# Patient Record
Sex: MALE | Race: WHITE | NOT HISPANIC OR LATINO | URBAN - METROPOLITAN AREA
[De-identification: names, ages, dates, MRNs, and addresses within clinical notes are randomized per-mention and may not be internally consistent; named-entity substitution may affect disease eponyms.]

---

## 2018-03-14 ENCOUNTER — EMERGENCY (EMERGENCY)
Facility: HOSPITAL | Age: 58
LOS: 0 days | Discharge: HOME | End: 2018-03-14
Attending: EMERGENCY MEDICINE | Admitting: EMERGENCY MEDICINE
Payer: COMMERCIAL

## 2018-03-14 VITALS
HEART RATE: 950 BPM | RESPIRATION RATE: 18 BRPM | OXYGEN SATURATION: 100 % | SYSTOLIC BLOOD PRESSURE: 117 MMHG | HEIGHT: 75 IN | WEIGHT: 207.9 LBS | TEMPERATURE: 102 F | DIASTOLIC BLOOD PRESSURE: 70 MMHG

## 2018-03-14 VITALS
TEMPERATURE: 99 F | SYSTOLIC BLOOD PRESSURE: 122 MMHG | RESPIRATION RATE: 19 BRPM | HEART RATE: 73 BPM | OXYGEN SATURATION: 100 % | DIASTOLIC BLOOD PRESSURE: 62 MMHG
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
## 2018-03-14 DIAGNOSIS — S09.90XA UNSPECIFIED INJURY OF HEAD, INITIAL ENCOUNTER: ICD-10-CM
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
## 2018-03-14 DIAGNOSIS — J18.9 PNEUMONIA, UNSPECIFIED ORGANISM: ICD-10-CM

## 2018-03-14 DIAGNOSIS — R55 SYNCOPE AND COLLAPSE: ICD-10-CM

## 2018-03-14 LAB
ALBUMIN SERPL ELPH-MCNC: 3.7 G/DL — SIGNIFICANT CHANGE UP (ref 3.3–5)
ALP SERPL-CCNC: 54 U/L — SIGNIFICANT CHANGE UP (ref 40–120)
ALT FLD-CCNC: 30 U/L — SIGNIFICANT CHANGE UP (ref 12–78)
ANION GAP SERPL CALC-SCNC: 6 MMOL/L — SIGNIFICANT CHANGE UP (ref 5–17)
AST SERPL-CCNC: 26 U/L — SIGNIFICANT CHANGE UP (ref 15–37)
BASOPHILS # BLD AUTO: 0.04 K/UL — SIGNIFICANT CHANGE UP (ref 0–0.2)
BASOPHILS NFR BLD AUTO: 0.6 % — SIGNIFICANT CHANGE UP (ref 0–2)
BILIRUB SERPL-MCNC: 0.5 MG/DL — SIGNIFICANT CHANGE UP (ref 0.2–1.2)
BUN SERPL-MCNC: 24 MG/DL — HIGH (ref 7–23)
CALCIUM SERPL-MCNC: 8.3 MG/DL — LOW (ref 8.5–10.1)
CHLORIDE SERPL-SCNC: 104 MMOL/L — SIGNIFICANT CHANGE UP (ref 96–108)
CO2 SERPL-SCNC: 27 MMOL/L — SIGNIFICANT CHANGE UP (ref 22–31)
CREAT SERPL-MCNC: 1.48 MG/DL — HIGH (ref 0.5–1.3)
EOSINOPHIL # BLD AUTO: 0.01 K/UL — SIGNIFICANT CHANGE UP (ref 0–0.5)
EOSINOPHIL NFR BLD AUTO: 0.1 % — SIGNIFICANT CHANGE UP (ref 0–6)
GLUCOSE SERPL-MCNC: 142 MG/DL — HIGH (ref 70–99)
HCT VFR BLD CALC: 42.5 % — SIGNIFICANT CHANGE UP (ref 39–50)
HGB BLD-MCNC: 14.2 G/DL — SIGNIFICANT CHANGE UP (ref 13–17)
IMM GRANULOCYTES NFR BLD AUTO: 0.3 % — SIGNIFICANT CHANGE UP (ref 0–1.5)
INR BLD: 1.21 RATIO — HIGH (ref 0.88–1.16)
LYMPHOCYTES # BLD AUTO: 0.71 K/UL — LOW (ref 1–3.3)
LYMPHOCYTES # BLD AUTO: 10.1 % — LOW (ref 13–44)
MAGNESIUM SERPL-MCNC: 1.7 MG/DL — SIGNIFICANT CHANGE UP (ref 1.6–2.6)
MCHC RBC-ENTMCNC: 30.2 PG — SIGNIFICANT CHANGE UP (ref 27–34)
MCHC RBC-ENTMCNC: 33.4 GM/DL — SIGNIFICANT CHANGE UP (ref 32–36)
MCV RBC AUTO: 90.4 FL — SIGNIFICANT CHANGE UP (ref 80–100)
MONOCYTES # BLD AUTO: 0.64 K/UL — SIGNIFICANT CHANGE UP (ref 0–0.9)
MONOCYTES NFR BLD AUTO: 9.1 % — SIGNIFICANT CHANGE UP (ref 2–14)
NEUTROPHILS # BLD AUTO: 5.64 K/UL — SIGNIFICANT CHANGE UP (ref 1.8–7.4)
NEUTROPHILS NFR BLD AUTO: 79.8 % — HIGH (ref 43–77)
NRBC # BLD: 0 /100 WBCS — SIGNIFICANT CHANGE UP (ref 0–0)
PLATELET # BLD AUTO: 181 K/UL — SIGNIFICANT CHANGE UP (ref 150–400)
POTASSIUM SERPL-MCNC: 4.3 MMOL/L — SIGNIFICANT CHANGE UP (ref 3.5–5.3)
POTASSIUM SERPL-SCNC: 4.3 MMOL/L — SIGNIFICANT CHANGE UP (ref 3.5–5.3)
PROT SERPL-MCNC: 7.4 GM/DL — SIGNIFICANT CHANGE UP (ref 6–8.3)
PROTHROM AB SERPL-ACNC: 13.1 SEC — HIGH (ref 9.8–12.7)
RBC # BLD: 4.7 M/UL — SIGNIFICANT CHANGE UP (ref 4.2–5.8)
RBC # FLD: 13 % — SIGNIFICANT CHANGE UP (ref 10.3–14.5)
SODIUM SERPL-SCNC: 137 MMOL/L — SIGNIFICANT CHANGE UP (ref 135–145)
TROPONIN I SERPL-MCNC: <0.015 NG/ML — SIGNIFICANT CHANGE UP (ref 0.01–0.04)
WBC # BLD: 7.06 K/UL — SIGNIFICANT CHANGE UP (ref 3.8–10.5)
WBC # FLD AUTO: 7.06 K/UL — SIGNIFICANT CHANGE UP (ref 3.8–10.5)

## 2018-03-14 PROCEDURE — 71045 X-RAY EXAM CHEST 1 VIEW: CPT | Mod: 26

## 2018-03-14 PROCEDURE — 93010 ELECTROCARDIOGRAM REPORT: CPT

## 2018-03-14 PROCEDURE — 70450 CT HEAD/BRAIN W/O DYE: CPT | Mod: 26

## 2018-03-14 PROCEDURE — 99284 EMERGENCY DEPT VISIT MOD MDM: CPT

## 2018-03-14 RX ORDER — ACETAMINOPHEN 500 MG
650 TABLET ORAL ONCE
Qty: 0 | Refills: 0 | Status: COMPLETED | OUTPATIENT
Start: 2018-03-14 | End: 2018-03-14

## 2018-03-14 RX ORDER — SODIUM CHLORIDE 9 MG/ML
2000 INJECTION INTRAMUSCULAR; INTRAVENOUS; SUBCUTANEOUS ONCE
Qty: 0 | Refills: 0 | Status: COMPLETED | OUTPATIENT
Start: 2018-03-14 | End: 2018-03-14

## 2018-03-14 RX ADMIN — SODIUM CHLORIDE 1000 MILLILITER(S): 9 INJECTION INTRAMUSCULAR; INTRAVENOUS; SUBCUTANEOUS at 08:14

## 2018-03-14 RX ADMIN — Medication 650 MILLIGRAM(S): at 08:04

## 2018-03-14 NOTE — ED ADULT TRIAGE NOTE - CHIEF COMPLAINT QUOTE
patient presents in ED by [facility] with a syncope episode at the gym. patient was in the shower after his workout and passed out. denies any medical problems. on no current medications. patient did his head. denies any pain. patient febrile in triage.

## 2018-03-14 NOTE — ED ADULT NURSE NOTE - OBJECTIVE STATEMENT
pt presents to ED via EMS from gym, pt alert and orientedx4, febrile, pt c/o syncope at the gyn, states he was feeling dizzy and has been sick for the apst two weeks. pt c/o kush aches cough and chilsl,  pt states he had gone ont he eliptical did not feel well, and went to shwoer when he syncopized. pt denies dizziness at this time no chest pain SOB,  dizziness weakness, safety maintained neuros intact, will continue to monitor

## 2018-03-14 NOTE — ED ADULT NURSE NOTE - CHIEF COMPLAINT QUOTE
patient presents in ED by [facility] with a syncope episode at the gym. patient was in the shower after his workout and passed out. denies any medical problems. on no current medications. patient did his head. denies any pain. patient febrile in triage.

## 2018-03-14 NOTE — ED PROVIDER NOTE - MEDICAL DECISION MAKING DETAILS
Pt febrile, dehydrated, and has community acquired pneumonia.  Treated with tylenol, IV fluids, and levaquin.  Feeling better.  Okay for d/c home.  Continue antibiotics, hydration, f/u with PCP.

## 2018-03-14 NOTE — ED PROVIDER NOTE - OBJECTIVE STATEMENT
56 yo M presents with no significant PMHx presents with CC of syncope.  Pt worked out this morning, went to sauna, and then took a shower.  Pt had 15 second syncopal event in the shower.  Pt did hit the back of his head on the floor.  States recently has had cough, and cold symptoms.  Denies any other symptoms at this time, and states he's starting to feel better. No other concerns.

## 2018-03-15 RX ORDER — CIPROFLOXACIN LACTATE 400MG/40ML
1 VIAL (ML) INTRAVENOUS
Qty: 4 | Refills: 0 | OUTPATIENT
Start: 2018-03-15 | End: 2018-03-18

## 2019-04-03 RX ORDER — FENTANYL CITRATE 50 UG/ML
25 INJECTION INTRAVENOUS
Qty: 0 | Refills: 0 | Status: DISCONTINUED | OUTPATIENT
Start: 2019-04-04 | End: 2019-04-04

## 2019-04-03 RX ORDER — FENTANYL CITRATE 50 UG/ML
50 INJECTION INTRAVENOUS
Qty: 0 | Refills: 0 | Status: DISCONTINUED | OUTPATIENT
Start: 2019-04-04 | End: 2019-04-04

## 2019-04-03 RX ORDER — OXYCODONE HYDROCHLORIDE 5 MG/1
5 TABLET ORAL ONCE
Qty: 0 | Refills: 0 | Status: DISCONTINUED | OUTPATIENT
Start: 2019-04-04 | End: 2019-04-04

## 2019-04-03 RX ORDER — ONDANSETRON 8 MG/1
4 TABLET, FILM COATED ORAL ONCE
Qty: 0 | Refills: 0 | Status: DISCONTINUED | OUTPATIENT
Start: 2019-04-04 | End: 2019-04-19

## 2019-04-04 ENCOUNTER — OUTPATIENT (OUTPATIENT)
Dept: OUTPATIENT SERVICES | Facility: HOSPITAL | Age: 59
LOS: 1 days | Discharge: HOME | End: 2019-04-04
Payer: COMMERCIAL

## 2019-04-04 VITALS
SYSTOLIC BLOOD PRESSURE: 129 MMHG | RESPIRATION RATE: 16 BRPM | DIASTOLIC BLOOD PRESSURE: 61 MMHG | TEMPERATURE: 98 F | HEART RATE: 62 BPM | OXYGEN SATURATION: 100 %

## 2019-04-04 VITALS
DIASTOLIC BLOOD PRESSURE: 78 MMHG | HEART RATE: 60 BPM | RESPIRATION RATE: 16 BRPM | TEMPERATURE: 98 F | OXYGEN SATURATION: 100 % | WEIGHT: 197.98 LBS | HEIGHT: 75 IN | SYSTOLIC BLOOD PRESSURE: 124 MMHG

## 2019-04-04 LAB
ABO RH CONFIRMATION: SIGNIFICANT CHANGE UP
APTT BLD: 29.8 SEC — SIGNIFICANT CHANGE UP (ref 27.5–36.3)
BLD GP AB SCN SERPL QL: SIGNIFICANT CHANGE UP
INR BLD: 1.03 RATIO — SIGNIFICANT CHANGE UP (ref 0.88–1.16)
PROTHROM AB SERPL-ACNC: 11.5 SEC — SIGNIFICANT CHANGE UP (ref 10–12.9)
TYPE + AB SCN PNL BLD: SIGNIFICANT CHANGE UP

## 2019-04-04 PROCEDURE — 88304 TISSUE EXAM BY PATHOLOGIST: CPT | Mod: 26

## 2019-04-04 RX ORDER — FAMOTIDINE 10 MG/ML
20 INJECTION INTRAVENOUS ONCE
Qty: 0 | Refills: 0 | Status: COMPLETED | OUTPATIENT
Start: 2019-04-04 | End: 2019-04-04

## 2019-04-04 RX ORDER — ACETAMINOPHEN 500 MG
975 TABLET ORAL ONCE
Qty: 0 | Refills: 0 | Status: COMPLETED | OUTPATIENT
Start: 2019-04-04 | End: 2019-04-04

## 2019-04-04 RX ADMIN — Medication 975 MILLIGRAM(S): at 12:41

## 2019-04-04 RX ADMIN — FENTANYL CITRATE 50 MICROGRAM(S): 50 INJECTION INTRAVENOUS at 17:53

## 2019-04-04 RX ADMIN — FAMOTIDINE 20 MILLIGRAM(S): 10 INJECTION INTRAVENOUS at 12:40

## 2019-04-04 NOTE — ASU DISCHARGE PLAN (ADULT/PEDIATRIC) - NURSING INSTRUCTIONS
Review home care information sheet Begin with liquids and light food ( tea, toast, Jello, soups). Advance to what you normally eat. Liquids should taken in adequate amounts today.Refer to multi color post op discharge instruction sheet     CALL the DOCTOR:    -Fever greater than  101F  - Signs  of infection such as : increase pain,swelling,redness,or a bad  smell coming from the wound.  -Excessive amount of bleeding.  - Any pain that appears to be getting worse.  - Vomiting  -  If you have  not urinated 8 hours after surgery or have any difficulty urinating.     A responsible adult should be with you for the rest of the day and night for your safety and to help you if you needed.    Review attached FACT SHEET if applicable. For any problems or concerns,contact your doctor. [facility] patients should call the [facility]. If you cannot reach the doctor or clinic, call [facility] Emergency Department at [phone] or go to your local Emergency Department.  A responsible adult should be with you for the rest of the day and night for your safety and to help you if you needed. Resume your medications as listed on the attached Medication Record.

## 2019-04-04 NOTE — ASU DISCHARGE PLAN (ADULT/PEDIATRIC) - CALL YOUR DOCTOR IF YOU HAVE ANY OF THE FOLLOWING:
Numbness, tingling, color or temperature change to extremity/Pain not relieved by Medications/Nausea and vomiting that does not stop/Inability to tolerate liquids or foods/Bleeding that does not stop

## 2019-04-04 NOTE — ASU DISCHARGE PLAN (ADULT/PEDIATRIC) - ACTIVITY LEVEL
No heavy lifting/today: keep R leg elevated, walk for ~ 5-10 minutes on the hour today while awake; tomorrow: resume normal activity as tolerated keeping R leg elevated when not walking/Elevate extremity

## 2019-04-08 LAB — SURGICAL PATHOLOGY STUDY: SIGNIFICANT CHANGE UP

## 2019-04-10 DIAGNOSIS — I87.8 OTHER SPECIFIED DISORDERS OF VEINS: ICD-10-CM

## 2019-04-10 DIAGNOSIS — I83.91 ASYMPTOMATIC VARICOSE VEINS OF RIGHT LOWER EXTREMITY: ICD-10-CM

## 2019-04-10 DIAGNOSIS — E78.00 PURE HYPERCHOLESTEROLEMIA, UNSPECIFIED: ICD-10-CM

## 2019-04-10 DIAGNOSIS — Z87.01 PERSONAL HISTORY OF PNEUMONIA (RECURRENT): ICD-10-CM

## 2021-09-09 NOTE — ED PROVIDER NOTE - ENDOCRINE NEGATIVE STATEMENT, MLM
Show Applicator Variable?: Yes Duration Of Freeze Thaw-Cycle (Seconds): 1 Post-Care Instructions: I reviewed with the patient in detail post-care instructions. Patient is to wear sunprotection, and avoid picking at any of the treated lesions. Pt may apply Vaseline to crusted or scabbing areas. Consent: The patient's consent was obtained including but not limited to risks of crusting, scabbing, blistering, scarring, darker or lighter pigmentary change, recurrence, incomplete removal and infection. Detail Level: Detailed Number Of Freeze-Thaw Cycles: 3 freeze-thaw cycles no diabetes and no thyroid trouble.

## 2021-12-28 ENCOUNTER — OUTPATIENT (OUTPATIENT)
Dept: OUTPATIENT SERVICES | Facility: HOSPITAL | Age: 61
LOS: 1 days | End: 2021-12-28
Payer: COMMERCIAL

## 2021-12-28 DIAGNOSIS — Z20.828 CONTACT WITH AND (SUSPECTED) EXPOSURE TO OTHER VIRAL COMMUNICABLE DISEASES: ICD-10-CM

## 2021-12-28 PROBLEM — J18.9 PNEUMONIA, UNSPECIFIED ORGANISM: Chronic | Status: ACTIVE | Noted: 2019-04-03

## 2021-12-28 PROBLEM — I83.90 ASYMPTOMATIC VARICOSE VEINS OF UNSPECIFIED LOWER EXTREMITY: Chronic | Status: ACTIVE | Noted: 2019-04-03

## 2021-12-28 LAB — SARS-COV-2 RNA SPEC QL NAA+PROBE: SIGNIFICANT CHANGE UP

## 2021-12-28 PROCEDURE — U0005: CPT

## 2021-12-28 PROCEDURE — U0003: CPT

## 2021-12-28 PROCEDURE — C9803: CPT

## 2021-12-29 DIAGNOSIS — Z20.828 CONTACT WITH AND (SUSPECTED) EXPOSURE TO OTHER VIRAL COMMUNICABLE DISEASES: ICD-10-CM

## 2024-12-18 DIAGNOSIS — Z00.00 ENCOUNTER FOR GENERAL ADULT MEDICAL EXAMINATION W/OUT ABNORMAL FINDINGS: ICD-10-CM

## 2024-12-20 ENCOUNTER — APPOINTMENT (OUTPATIENT)
Dept: ORTHOPEDIC SURGERY | Facility: CLINIC | Age: 64
End: 2024-12-20
Payer: COMMERCIAL

## 2024-12-20 VITALS — HEIGHT: 74 IN | WEIGHT: 193 LBS | BODY MASS INDEX: 24.77 KG/M2

## 2024-12-20 DIAGNOSIS — M76.892 OTHER SPECIFIED ENTHESOPATHIES OF LEFT LOWER LIMB, EXCLUDING FOOT: ICD-10-CM

## 2024-12-20 PROCEDURE — 73502 X-RAY EXAM HIP UNI 2-3 VIEWS: CPT

## 2024-12-20 PROCEDURE — 99204 OFFICE O/P NEW MOD 45 MIN: CPT

## 2024-12-20 NOTE — DISCUSSION/SUMMARY
[de-identified] : The patient has left hip abductor tendinitis and greater trochanteric bursitis.   They are not an appropriate candidate for surgical intervention at this time. An extensive discussion was conducted on the natural history of the disease and the variety of surgical and non-surgical options available to the patient including, but not limited to non-steroidal anti-inflammatory medications, steroid injections, physical therapy, maintenance of ideal body weight, and reduction of activity. I recommended a prescription of Mobic but the patient would prefer to use OTC NSAIDs at this time. The patient will schedule an appointment as needed.Today I provided him with a home exercise program, physical therapy prescription, picture of a foam roller and he will use over-the-counter anti-inflammatories. PT also rx.  If there is no improvement over the next 6 to 8 weeks he should give us a call and we will look to get an MRI of the left hip.

## 2024-12-20 NOTE — HISTORY OF PRESENT ILLNESS
[de-identified] : This is a very nice  64 year old  male experiencing pain in the lateral aspect of the left  hip which is moderate - severe in intensity and has been going on for at least 4 months . The pain somewhat limits activities of daily living. Walking tolerance is reduced. Medication and activity modification have been minimally effective. The patient does not report any pain in the groin.  The patient denies any radiation of the pain to the feet and it is not associated with numbness, tingling, or weakness.

## 2024-12-20 NOTE — PHYSICAL EXAM
[de-identified] : Well developed, well nourished in no apparent distress, awake, alert and orientated to person, place and time with appropriate mood and affect Respirations are even and unlabored. Gait evaluation does not reveal a limp. There is no inguinal adenopathy. The affected limb is well-perfused with palpable pedal pulse, without skin lesions, shows a grossly normal motor and sensory examination. Satisfactory ROM of the affected hip without groin pain. Palpation to the lateral aspect of the hip reproduces pain. [de-identified] :  AP pelvis, AP and lateral hip radiographs of the left hip were ordered and taken in the office and demonstrate no evidence of degenerative joint disease of the hip with maintained joint space and no evidence of fractures or other intraarticular pathology.

## 2025-06-07 NOTE — ED ADULT TRIAGE NOTE - BMI (KG/M2)
"KIDNEY TRANSPLANT MEDICINE PROGRESS NOTE    Please refer to detailed progress note from 6/6 for complete details of this admission.    Interval history: patient is feeling fine. No acute issues to discuss with me    Past medical, surgical, family, social history reviewed & unchanged since admission.     I/O last 3 completed shifts:  In: -   Out: 3300 [Urine:3300]    VITALS:  height is 6' 1" (1.854 m) and weight is 93.6 kg (206 lb 5.6 oz). His oral temperature is 97.6 °F (36.4 °C). His blood pressure is 148/73 (abnormal) and his pulse is 62. His respiration is 17 and oxygen saturation is 99%.       A&O x3, NAD.  Lungs CTA, unlabored.  Heart regular, no rub.  Abdomen soft, nontender, no masses.  Allograft nontender.  Edema: none.    Recent Labs   Lab 06/05/25  0609 06/06/25  0439 06/07/25  0705   WBC 5.52 5.83 5.36   HGB 8.8* 9.7* 9.5*   HCT 29.4* 35.7* 32.3*    190 179       Recent Labs   Lab 06/05/25  0609 06/06/25  0439 06/07/25  0705    137 134*   K 3.7 3.6 3.7    103 102   CO2 25 21* 24   BUN 61* 65* 69*   CREATININE 3.2* 3.1* 3.2*   CALCIUM 8.5* 8.6* 8.5*   PHOS 4.5 4.8* 4.6*     Recent Labs   Lab 06/04/25  0904 06/05/25  0609 06/06/25  0439   Tacrolimus 6.6 7.2 7.1       ASSESSMENT/PLAN:    Problems/plans as noted in the referenced progress note. Additional pertinent findings:  Ckd stage 4 with ary  Immunosuppressed state due to high risk medication  Mild hyperphosphatemia  Hypertension  Mild hyponatremia   Anemia of ckd stage 4    Prograf level, high risk medication is pending  Avoid hypotonic solutions  Iron studies     CKD post kidney transplant  -Follow with strict I/Os, daily weights, BP (goal <140/90), daily labs.    -Avoid nephrotoxic agents when feasible (NSAIDs, IV contrast dye, Aminoglycoside-containing antibiotics)  -Renally dose all appropriate medications, including antibiotics      Encounter for Monitoring Immunosuppression post Transplant  -Continue to trend immunosuppression " levels.   -Monitor for med-related side effects or drug toxicity given immunosuppressant med with narrow therapeutic window.      26